# Patient Record
Sex: FEMALE | Race: WHITE
[De-identification: names, ages, dates, MRNs, and addresses within clinical notes are randomized per-mention and may not be internally consistent; named-entity substitution may affect disease eponyms.]

---

## 2019-01-08 ENCOUNTER — HOSPITAL ENCOUNTER (EMERGENCY)
Dept: HOSPITAL 62 - ER | Age: 49
LOS: 1 days | Discharge: HOME | End: 2019-01-09
Payer: COMMERCIAL

## 2019-01-08 DIAGNOSIS — M54.6: ICD-10-CM

## 2019-01-08 DIAGNOSIS — F17.200: ICD-10-CM

## 2019-01-08 DIAGNOSIS — R07.9: Primary | ICD-10-CM

## 2019-01-08 DIAGNOSIS — R11.0: ICD-10-CM

## 2019-01-08 DIAGNOSIS — M79.602: ICD-10-CM

## 2019-01-08 DIAGNOSIS — R06.02: ICD-10-CM

## 2019-01-08 LAB
ADD MANUAL DIFF: NO
ALBUMIN SERPL-MCNC: 4.1 G/DL (ref 3.5–5)
ALP SERPL-CCNC: 79 U/L (ref 38–126)
ALT SERPL-CCNC: 32 U/L (ref 9–52)
ANION GAP SERPL CALC-SCNC: 7 MMOL/L (ref 5–19)
AST SERPL-CCNC: 23 U/L (ref 14–36)
BASOPHILS # BLD AUTO: 0.1 10^3/UL (ref 0–0.2)
BASOPHILS NFR BLD AUTO: 1 % (ref 0–2)
BILIRUB DIRECT SERPL-MCNC: 0.1 MG/DL (ref 0–0.4)
BILIRUB SERPL-MCNC: 0.3 MG/DL (ref 0.2–1.3)
BUN SERPL-MCNC: 14 MG/DL (ref 7–20)
CALCIUM: 9.4 MG/DL (ref 8.4–10.2)
CHLORIDE SERPL-SCNC: 103 MMOL/L (ref 98–107)
CO2 SERPL-SCNC: 30 MMOL/L (ref 22–30)
EOSINOPHIL # BLD AUTO: 0.1 10^3/UL (ref 0–0.6)
EOSINOPHIL NFR BLD AUTO: 1.4 % (ref 0–6)
ERYTHROCYTE [DISTWIDTH] IN BLOOD BY AUTOMATED COUNT: 13.7 % (ref 11.5–14)
GLUCOSE SERPL-MCNC: 96 MG/DL (ref 75–110)
HCT VFR BLD CALC: 40 % (ref 36–47)
HGB BLD-MCNC: 13.6 G/DL (ref 12–15.5)
LYMPHOCYTES # BLD AUTO: 2.9 10^3/UL (ref 0.5–4.7)
LYMPHOCYTES NFR BLD AUTO: 42.6 % (ref 13–45)
MCH RBC QN AUTO: 29.9 PG (ref 27–33.4)
MCHC RBC AUTO-ENTMCNC: 33.9 G/DL (ref 32–36)
MCV RBC AUTO: 88 FL (ref 80–97)
MONOCYTES # BLD AUTO: 0.5 10^3/UL (ref 0.1–1.4)
MONOCYTES NFR BLD AUTO: 7.4 % (ref 3–13)
NEUTROPHILS # BLD AUTO: 3.3 10^3/UL (ref 1.7–8.2)
NEUTS SEG NFR BLD AUTO: 47.6 % (ref 42–78)
PLATELET # BLD: 232 10^3/UL (ref 150–450)
POTASSIUM SERPL-SCNC: 4.1 MMOL/L (ref 3.6–5)
PROT SERPL-MCNC: 6.4 G/DL (ref 6.3–8.2)
RBC # BLD AUTO: 4.54 10^6/UL (ref 3.72–5.28)
SODIUM SERPL-SCNC: 139.6 MMOL/L (ref 137–145)
TOTAL CELLS COUNTED % (AUTO): 100 %
WBC # BLD AUTO: 6.9 10^3/UL (ref 4–10.5)

## 2019-01-08 PROCEDURE — 71045 X-RAY EXAM CHEST 1 VIEW: CPT

## 2019-01-08 PROCEDURE — 36415 COLL VENOUS BLD VENIPUNCTURE: CPT

## 2019-01-08 PROCEDURE — 85379 FIBRIN DEGRADATION QUANT: CPT

## 2019-01-08 PROCEDURE — 99285 EMERGENCY DEPT VISIT HI MDM: CPT

## 2019-01-08 PROCEDURE — 80053 COMPREHEN METABOLIC PANEL: CPT

## 2019-01-08 PROCEDURE — 84484 ASSAY OF TROPONIN QUANT: CPT

## 2019-01-08 PROCEDURE — 85025 COMPLETE CBC W/AUTO DIFF WBC: CPT

## 2019-01-08 PROCEDURE — 93005 ELECTROCARDIOGRAM TRACING: CPT

## 2019-01-08 PROCEDURE — 93010 ELECTROCARDIOGRAM REPORT: CPT

## 2019-01-08 NOTE — ER DOCUMENT REPORT
ED General





- General


Chief Complaint: Chest Pain > 30


Stated Complaint: OTHER


Time Seen by Provider: 01/08/19 19:29


Mode of Arrival: Medic


Information source: Patient


Notes: 





48-year-old female brought to the emergency department by EMS for complaints of 

chest pain.  Patient states that the pain has been present for the last week.  

She states that initially the pain was located in her upper back.  She states 

that this subsided and then she began having midsternal chest pain.  She is also

noticed some pain to her left arm.  She states that her substernal chest pain 

has been present for the last 4 days.  It has been constant.  She describes it 

as an aching sensation.  She denies any alleviating or exacerbating factors.  

Patient does have a history of hypertension but denies diabetes, hyperlipidemia,

coronary artery disease.  Patient states that she does smoke.  There is a family

history of coronary artery disease.  She has had some shortness of breath and 

nausea. She denies any recent travel, recent surgery, calf pain, history of DVT 

or PE. History of thyroid cancer.  Patient received aspirin and nitro by EMS.  

She is currently symptom-free.





- HPI


Onset: Last week


Onset/Duration: Gradual, Persistent


Severity: Mild


Associated symptoms: Nausea, Shortness of breath


Exacerbated by: Denies


Relieved by: Other - Aspirin, nitro


Similar symptoms previously: No


Recently seen / treated by doctor: No





- Related Data


Allergies/Adverse Reactions: 


                                        





No Known Allergies Allergy (Unverified 01/08/19 20:01)


   











Past Medical History





- General


Information source: Patient





- Social History


Smoking Status: Current Every Day Smoker


Family History: CAD





Review of Systems





- Review of Systems


Constitutional: No symptoms reported


EENT: No symptoms reported


Cardiovascular: Chest pain


Respiratory: Short of breath


Gastrointestinal: Nausea


Genitourinary: No symptoms reported


Female Genitourinary: No symptoms reported


Musculoskeletal: No symptoms reported


Skin: No symptoms reported


Hematologic/Lymphatic: No symptoms reported


Neurological/Psychological: No symptoms reported


-: Yes All other systems reviewed and negative





Physical Exam





- Vital signs


Vitals: 


                                        











Temp Resp BP Pulse Ox


 


 98.0 F   14   158/83 H  100 


 


 01/08/19 19:36  01/08/19 19:36  01/08/19 19:36  01/08/19 19:36














- Notes


Notes: 





PHYSICAL EXAMINATION:





GENERAL: Well-appearing, well-nourished and in no acute distress.





HEAD: Atraumatic, normocephalic.





EYES: Pupils equal round and reactive to light, extraocular movements intact, 

conjunctiva are normal.





ENT: Nares patent, oropharynx clear without exudates.  Moist mucous membranes.





NECK: Normal range of motion, supple without lymphadenopathy





LUNGS: Breath sounds clear to auscultation bilaterally and equal.  No wheezes 

rales or rhonchi.





HEART: Regular rate and rhythm without murmurs





ABDOMEN: Soft, nontender, nondistended abdomen.  No guarding, no rebound.  No 

masses appreciated.





Female : deferred





Musculoskeletal: Normal range of motion, no pitting or edema.  No cyanosis.





NEUROLOGICAL: Cranial nerves grossly intact.  Normal speech, normal gait.  

Normal sensory, motor exams





PSYCH: Normal mood, normal affect.





SKIN: Warm, Dry, normal turgor, no rashes or lesions noted.





Course





- Re-evaluation


Re-evalutation: 





01/08/19 19:53


EKG: Ventricular rate 60, NV interval 168, QRS duration 80, QTc 444, sinus 

rhythm.  No ST segment elevation.


01/09/19 01:01





Heart score of 3. Labs and imaging obtained.  Troponin is negative.  D-dimer is 

negative.  No acute process seen on chest x-ray.  Patient continues to be pain-

free.  I instructed the patient to follow-up with her primary care physician 

this week, to take over-the-counter medication as needed, and to return for any 

worsening symptoms.


01/09/19 01:05








- Vital Signs


Vital signs: 


                                        











Temp Pulse Resp BP Pulse Ox


 


 98.0 F   74   14   153/78 H  98 


 


 01/08/19 19:36  01/08/19 21:10  01/08/19 23:02  01/08/19 23:02  01/08/19 23:02














- Laboratory


Result Diagrams: 


                                 01/08/19 19:34





                                 01/08/19 19:34





Discharge





- Discharge


Clinical Impression: 


Chest pain


Qualifiers:


 Chest pain type: unspecified Qualified Code(s): R07.9 - Chest pain, unspecified





Condition: Good


Disposition: HOME, SELF-CARE


Instructions:  Chest Pain of Unclear Cause ([facility])


Referrals: 


ALEXANDRO MADRIGAL MD [[facility]] - Follow up as needed

## 2019-01-08 NOTE — RADIOLOGY REPORT (SQ)
EXAM DESCRIPTION:  CHEST SINGLE VIEW



COMPLETED DATE/TIME:  1/8/2019 8:10 pm



REASON FOR STUDY:  chest pain



COMPARISON:  None.



EXAM PARAMETERS:  NUMBER OF VIEWS: One view.

TECHNIQUE: Single frontal radiographic view of the chest acquired.

RADIATION DOSE: NA

LIMITATIONS: None.



FINDINGS:  LUNGS AND PLEURA: No opacities, masses or pneumothorax. No pleural effusion.

MEDIASTINUM AND HILAR STRUCTURES: No masses.  Contour normal.

HEART AND VASCULAR STRUCTURES: Heart normal in size.  Normal vasculature.

BONES: No acute findings.

HARDWARE: None in the chest.

OTHER: No other significant finding.



IMPRESSION:  NO ACUTE RADIOGRAPHIC FINDING IN THE CHEST.



TECHNICAL DOCUMENTATION:  JOB ID:  [id number]

 2011 [employer]- All Rights Reserved



Reading location - IP/workstation name: [email or web address]

## 2019-01-09 VITALS — DIASTOLIC BLOOD PRESSURE: 78 MMHG | SYSTOLIC BLOOD PRESSURE: 153 MMHG

## 2020-09-09 NOTE — ER [facility] ASSESSMENT REPORT
Intake





- In the Last 14 days


Have you traveled outside [place]?: No


Have you been in close contact with someone CONFIRMED: No


Worked in Healthcare?: No





- Symptoms


Subjective Fever(Felt feverish): Yes


Chills: Yes


Muscule Aches: Yes


Runny Nose: Yes


Sore Throat: Yes


Cough (New or worsening chronic cough): Yes


Shortness of breath: Yes


Nausea or Vomiting: Yes


--How many day(s)?: No vomiting


Headache: Yes


Abdominal Pain: Yes


Diarrhea(3 or more loose stools in last 24 hours): Yes





- Do you have any of the following


Chronic lung disease: Asthma or emphysema or COPD: No


Cystic Fibrosis: No


Diabetes: No


High Blood Pressure: No


Cardiovascular Disease: No


Chronic Kidney Disease: No


Chronic Liver Disease: No


Chronic blood disorder like Sickle Cell Disease: No


Weak immune system due to disease or medication: No


Neurologic condition that limits movement: No


Developmental delay - Moderate to Severe: No


Recent (within past 2 weeks) or current Pregnancy: No


Morbid Obesity (>100 pounds over ideal weight): No


Obesity Comment: 





Height 5 feet 5 inches weight 238 pounds.


Other Comment: 





History of rheumatoid arthritis





- Objective


Temperature: 98.1 F


Pulse Rate: 76


Respiratory Rate: 18


Blood Pressure: 139/79


O2 Sat by Pulse Oximetry: 97


Objective: 


Given above, testing performed: 
































If Testing Performed:


Test Specimen Type Sent to











General





- General


Information source: Patient


Notes: 





Patient here at [facility] for cover testing reports having symptoms of upper re

spiratory cough fever muscle aches runny nose sore throat shortness of breath 

abdominal pain diarrhea and nausea for almost 2 weeks patient denies being 

exposed to anybody known positive for COVID at this time.  Patient is a patient 

of Emerita Toro NP. 





- Related Data


Allergies/Adverse Reactions: 


                                        





No Known Allergies Allergy (Unverified 01/08/19 20:01)


   











Past Medical History





- General


Information source: Patient





- Social History


Smoking Status: Former Smoker - Quit smoking a year ago


Family History: CAD


Renal/ Medical History: Denies: Hx Peritoneal Dialysis





Physical Exam





- General


General appearance: Appears well, Alert


In distress: None


Notes: 





PHYSICAL EXAMINATION: 


GENERAL: Well-appearing and in no acute distress. 


HEAD: Atraumatic, normocephalic. 


EYES: sclera anicteric, conjunctiva are normal. 


ENT: nares patent. Moist mucous membranes. 


NECK: Normal range of motion, supple without lymphadenopathy 


LUNGS: CTAB and equal. No wheezes rales or rhonchi.  Respirations even and 

unlabored lung sounds clear.


HEART: Regular rate and rhythm without murmurs 


ABDOMEN: Soft, nontender, normal bowel sounds, no guarding. 


EXTREMITIES:  No cyanosis. 


NEUROLOGICAL:  Normal speech.


PSYCH: Normal mood, normal affect. 


SKIN: Warm, Dry, normal turgor, 





Diagnostic Results


Laboratory Results: 


Patient informed of negative rapid strep results.  Pending strep culture pending

cover testing results.  Patient provided instructions regarding code to include:

As a person under investigation for Covid 19, the [facility] of 

Health and Human Services, division of public health advises you to adhere to 

the following guidance until your test results are reported to you.  If your 

test result is positive, you will receive additional information from your 

provider and your local health department at that time.


 


Remain at home until you are cleared by the health provider or public health 

authorities.


 


Keep a log of visitors to your home, notify any visitors to your home of your 

isolation status.


 


If you plan to move to a new address or leave the county, notify the local 

health department in your County.


 


Call your doctor or seek care if you have an urgent medical need.  Before 

seeking medical care, call ahead to get instructions from the provider before 

arriving at the medical office clinic or hospital.  Notify them that you are 

being tested for the virus that causes Covid 19 so that arrangements can be 

made, as necessary, to prevent transmission to others in the healthcare setting.

 Next, notify the local health department in your county.


 


If a medical emergency arises and you need to call 911, inform the first 

responders that you are being tested for the virus that causes Covid 19.  Next, 

notify the local health department in your county.





Patient Education/Counseling


Counseling/Education: 





Patient presents with upper respiratory symptoms worrisome for possible Covid 

19.  Patient does not have emergency worring symptoms such as difficulty 

breathing, shortness of breath, chest pain, pressure, confusion or cyanosis.  

Patient appears suitable for discharge.  Patient struck to follow-up with PCP 

Emerita Toro NP.  To ED for persistent or worsening symptoms.  Patient's 

vital signs are stable and patient is nontoxic in appearance.  Good return 

precautions have been discussed with patient, patient verbalized understanding 

and is agreeable with discharge plan of care at this time.





[facility] Discharge





- Discharge


Condition: Stable


Disposition: Home; Selfcare

## 2020-11-05 NOTE — ER [facility] ASSESSMENT REPORT
Intake





- In the Last 14 days


Have you traveled outside [place]?: No


Have you been in close contact with someone CONFIRMED: Yes


Worked in Healthcare?: No





- Symptoms


Subjective Fever(Felt feverish): No


Chills: No


Muscule Aches: Yes


Runny Nose: Yes


Sore Throat: No


Cough (New or worsening chronic cough): Yes


Shortness of breath: Yes


Nausea or Vomiting: Yes


Headache: Yes


Abdominal Pain: No


Diarrhea(3 or more loose stools in last 24 hours): Yes





- Do you have any of the following


Chronic lung disease: Asthma or emphysema or COPD: No


Cystic Fibrosis: No


Diabetes: No


High Blood Pressure: Yes


Cardiovascular Disease: No


Chronic Kidney Disease: No


Chronic Liver Disease: No


Chronic blood disorder like Sickle Cell Disease: No


Weak immune system due to disease or medication: Yes


Immune System Comment: 





RA


Neurologic condition that limits movement: No


Developmental delay - Moderate to Severe: No


Recent (within past 2 weeks) or current Pregnancy: No


Morbid Obesity (>100 pounds over ideal weight): Yes





- Objective


Temperature: 98.2 F


Pulse Rate: 77


Respiratory Rate: 15


Blood Pressure: 171/80


O2 Sat by Pulse Oximetry: 96


Objective: 


Given above, testing performed: 
































If Testing Performed:


Test Specimen Type Sent to











General





- General


Information source: Patient


Notes: 





Presents to the [facility] for screening for the coronavirus.  Patient was around a 

coworker who recently tested positive.  Patient complains of body aches, runny 

nose, cough shortness of breath nausea and headache.  Patient has underlying 

history of high blood pressure rheumatoid arthritis acid reflux and 

hypothyroidism.





- Related Data


Allergies/Adverse Reactions: 


                                        





No Known Allergies Allergy (Unverified 01/08/19 20:01)


   











Past Medical History





- General


Information source: Patient





- Social History


Smoking Status: Former Smoker


Family History: CAD





- Past Medical History


Cardiac Medical History: Reports: Hx Hypertension


Endocrine Medical History: Reports: Hx Hypothyroidism


Renal/ Medical History: Denies: Hx Peritoneal Dialysis


GI Medical History: Reports: Hx Gastroesophageal Reflux Disease


Past Surgical History: Reports: Hx Orthopedic Surgery





Physical Exam





- Notes


Notes: 





The patient was evaluated during the global Covid 19 pandemic, and that 

diagnosis was suspected/considered upon their initial presentation.  Their 

evaluation, treatment and testing was consistent with current guidelines for 

patients who present with complaints or symptoms that may be related to Covid 

19.





Full physical exam could not be performed due to covid 19 isolation protocols.





Constitutional: Nontoxic appearance, no acute distress


Eyes: Nonicteric, extraocular movements intact, sclera clear


Cardiovascular: Heart rate and rhythm regular, no JVD


Respiratory: Occasional dry cough nonlabored breathing, no use of accessory 

muscles, no tachypnea


Gastrointestinal: Abdomen not distended


Muculoskeletal: Moves all extremities well


Skin: Normal color


Neuro: Awake alert oriented, normal speech


Psych: Normal mood and affect





Diagnostic Results


Laboratory Results: 


Patient presents with upper respiratory symptoms worrisome for possible Covid 

19.  Patient does not have emergency worrying symptoms such as difficulty 

breathing, shortness of breath, chest pain, pressure, confusion or cyanosis.  

Patient appears suitable for discharge as  vital signs are stable and patient is

nontoxic in appearance.  Good return precautions have been discussed with 

patient, patient verbalized understanding and is agreeable with discharge plan 

of care at this time.





Patient Education/Counseling


Counseling/Education: 





Patient was provided with discharge information including:





As a person under investigation for Covid 19, the [facility] of 

Health and Human Services, division of public health advises you to adhere to 

the following guidance until your test results are reported to you.  If your 

test result is positive, you will receive additional information from your 

provider and your local health department at that time.





Remain at home until you are cleared by the health provider or public health 

authorities.





Keep a log of visitors to your home, notify any visitors to your home of your 

isolation status.





If you plan to move to a new address or leave the Critical access hospital, notify the local [facility] department in your County.





Call your doctor or seek care if you have an urgent medical need.  Before 

seeking medical care, call ahead to get instructions from the provider before 

arriving at the medical office clinic or hospital.  Notify them that you are 

being tested for the virus that causes Covid 19 so that arrangements can be 

made, as necessary, to prevent transmission to others in the healthcare setting.

 Next, notify the local health department in your county.





If a medical emergency arises and you need to call 911, inform the first 

responders that you are being tested for the virus that causes Covid 19.  Next, 

notify the [facility] health department in your Critical access hospital.





[facility] Discharge





- Discharge


Clinical Impression: 


 Encounter for screening laboratory testing for COVID-19 virus





Condition: Stable


Disposition: Home; Selfcare